# Patient Record
Sex: FEMALE | URBAN - METROPOLITAN AREA
[De-identification: names, ages, dates, MRNs, and addresses within clinical notes are randomized per-mention and may not be internally consistent; named-entity substitution may affect disease eponyms.]

---

## 2021-03-30 ENCOUNTER — APPOINTMENT (RX ONLY)
Dept: URBAN - METROPOLITAN AREA CLINIC 134 | Facility: CLINIC | Age: 41
Setting detail: DERMATOLOGY
End: 2021-03-30

## 2021-03-30 VITALS — TEMPERATURE: 98.1 F

## 2021-03-30 DIAGNOSIS — Z41.9 ENCOUNTER FOR PROCEDURE FOR PURPOSES OTHER THAN REMEDYING HEALTH STATE, UNSPECIFIED: ICD-10-CM

## 2021-03-30 PROCEDURE — ? COOLSCULPTING

## 2021-03-30 NOTE — PROCEDURE: COOLSCULPTING
Detail Level: Detailed
Time (Minutes - Will Only Render If Nonzero): 35
Post Treatment: After treatment, the suction was turned off, and the applicator was removed from the skin.
Time (Minutes - Will Only Render If Nonzero): 35
Applicator Size: CoolAdvantage Core
Treatment Administered By (Optional): Tina Siegrist
Location 2: lower abdomen (right)
Suction Settings: The suction settings were per protocol.
Price (Use Numbers Only, No Special Characters Or $): 1500
Time (Minutes - Will Only Render If Nonzero): 45
Applicator Size: CoolAdvantage Fit
Consent: Written consent obtained, risks reviewed including, but not limited to, blistering from suction, darker or lighter pigmentary change, bruising, and/or need for multiple treatments.
Location 3: upper abdomen
Device: Coolsculpting
Intro: Prior to treatment, the area was cleaned with alcohol and marked out with a marking pen. The gel sheet was then applied uniformly. The applicator was applied to the skin with good contact and suction.
Applicator Size: CoolAdvantage Curve
Location 1: lower abdomen (left)
Applicator Size: CoolAdvantage Core Plus